# Patient Record
Sex: MALE | Race: WHITE | URBAN - METROPOLITAN AREA
[De-identification: names, ages, dates, MRNs, and addresses within clinical notes are randomized per-mention and may not be internally consistent; named-entity substitution may affect disease eponyms.]

---

## 2017-06-06 ENCOUNTER — OFFICE VISIT (OUTPATIENT)
Dept: ORTHOPEDIC SURGERY | Age: 52
End: 2017-06-06

## 2017-06-06 VITALS — WEIGHT: 175 LBS | BODY MASS INDEX: 26.52 KG/M2 | HEIGHT: 68 IN

## 2017-06-06 DIAGNOSIS — M72.2 PLANTAR FASCIITIS, RIGHT: ICD-10-CM

## 2017-06-06 DIAGNOSIS — M79.671 PAIN OF RIGHT HEEL: Primary | ICD-10-CM

## 2017-06-06 PROCEDURE — 73650 X-RAY EXAM OF HEEL: CPT | Performed by: PODIATRIST

## 2017-06-06 PROCEDURE — 99203 OFFICE O/P NEW LOW 30 MIN: CPT | Performed by: PODIATRIST

## 2017-06-06 RX ORDER — IBUPROFEN 800 MG/1
800 TABLET ORAL EVERY 6 HOURS PRN
COMMUNITY

## 2017-06-06 RX ORDER — PREDNISONE 10 MG/1
TABLET ORAL
Qty: 26 TABLET | Refills: 0 | Status: SHIPPED | OUTPATIENT
Start: 2017-06-06

## 2017-07-05 ENCOUNTER — OFFICE VISIT (OUTPATIENT)
Dept: ORTHOPEDIC SURGERY | Age: 52
End: 2017-07-05

## 2017-07-05 VITALS
SYSTOLIC BLOOD PRESSURE: 117 MMHG | HEART RATE: 74 BPM | BODY MASS INDEX: 26.53 KG/M2 | HEIGHT: 68 IN | WEIGHT: 175.04 LBS | DIASTOLIC BLOOD PRESSURE: 77 MMHG

## 2017-07-05 DIAGNOSIS — M72.2 PLANTAR FASCIITIS, RIGHT: Primary | ICD-10-CM

## 2017-07-05 PROCEDURE — 99213 OFFICE O/P EST LOW 20 MIN: CPT | Performed by: PODIATRIST

## 2017-08-02 ENCOUNTER — TELEPHONE (OUTPATIENT)
Dept: ORTHOPEDIC SURGERY | Age: 52
End: 2017-08-02

## 2017-08-02 ENCOUNTER — OFFICE VISIT (OUTPATIENT)
Dept: ORTHOPEDIC SURGERY | Age: 52
End: 2017-08-02

## 2017-08-02 VITALS
BODY MASS INDEX: 26.53 KG/M2 | HEART RATE: 60 BPM | WEIGHT: 175.04 LBS | HEIGHT: 68 IN | SYSTOLIC BLOOD PRESSURE: 114 MMHG | DIASTOLIC BLOOD PRESSURE: 74 MMHG

## 2017-08-02 DIAGNOSIS — M72.2 PLANTAR FASCIITIS, RIGHT: Primary | ICD-10-CM

## 2017-08-02 PROCEDURE — L4361 PNEUMA/VAC WALK BOOT PRE OTS: HCPCS | Performed by: PODIATRIST

## 2017-08-02 PROCEDURE — 99213 OFFICE O/P EST LOW 20 MIN: CPT | Performed by: PODIATRIST

## 2017-08-30 ENCOUNTER — OFFICE VISIT (OUTPATIENT)
Dept: ORTHOPEDIC SURGERY | Age: 52
End: 2017-08-30

## 2017-08-30 VITALS
HEART RATE: 74 BPM | SYSTOLIC BLOOD PRESSURE: 111 MMHG | HEIGHT: 68 IN | WEIGHT: 175.04 LBS | DIASTOLIC BLOOD PRESSURE: 80 MMHG | BODY MASS INDEX: 26.53 KG/M2

## 2017-08-30 DIAGNOSIS — M72.2 PLANTAR FASCIITIS, RIGHT: Primary | ICD-10-CM

## 2017-08-30 PROCEDURE — 99213 OFFICE O/P EST LOW 20 MIN: CPT | Performed by: PODIATRIST

## 2017-08-30 RX ORDER — DEXAMETHASONE SODIUM PHOSPHATE 4 MG/ML
INJECTION, SOLUTION INTRA-ARTICULAR; INTRALESIONAL; INTRAMUSCULAR; INTRAVENOUS; SOFT TISSUE
Qty: 30 ML | Refills: 0 | Status: SHIPPED | OUTPATIENT
Start: 2017-08-30

## 2017-09-13 ENCOUNTER — HOSPITAL ENCOUNTER (OUTPATIENT)
Dept: PHYSICAL THERAPY | Age: 52
Discharge: HOME | End: 2017-09-30
Admitting: PODIATRIST

## 2017-09-13 NOTE — PLAN OF CARE
plantar fasciitis (chronic), hx R ankle sprains    Falls Risk Assessment (30 days):   [x] Falls Risk assessed and no intervention required. [] Falls Risk assessed and Patient requires intervention due to being higher risk   TUG score (>12s at risk):     [] Falls education provided, including       G-Codes:  PT G-Codes  Functional Assessment Tool Used: LEFS  Score: 43%  Functional Limitation: Mobility: Walking and moving around  Mobility: Walking and Moving Around Current Status (): At least 40 percent but less than 60 percent impaired, limited or restricted  Mobility: Walking and Moving Around Goal Status ():  At least 1 percent but less than 20 percent impaired, limited or restricted    ASSESSMENT:   Functional Impairments:     []Noted lumbar/proximal hip/LE joint hypomobility   [x]Decreased LE functional ROM   [x]Decreased core/proximal hip strength and neuromuscular control   [x]Decreased LE functional strength   [x]Reduced balance/proprioceptive control   []other:      Functional Activity Limitations (from functional questionnaire and intake)   [x]Reduced ability to tolerate prolonged functional positions:standing   []Reduced ability or difficulty with changes of positions or transfers between positions   []Reduced ability to maintain good posture and demonstrate good body mechanics with sitting, bending, and lifting   []Reduced ability to sleep   [] Reduced ability or tolerance with driving and/or computer work   []Reduced ability to perform lifting, carrying tasks   []Reduced ability to squat   []Reduced ability to forward bend   [x]Reduced ability to ambulate prolonged functional periods/distances/surfaces   []Reduced ability to ascend/descend stairs   [x]Reduced ability to run, hop, cut or jump   []other:    Participation Restrictions   []Reduced participation in self care activities   [x]Reduced participation in home management activities   []Reduced participation in work activities   [x]Reduced participation in social activities. [x]Reduced participation in sport/recreation activities. Classification :    []Signs/symptoms consistent with post-surgical status including decreased ROM, strength and function. []Signs/symptoms consistent with joint sprain/strain   []Signs/symptoms consistent with patella-femoral syndrome   []Signs/symptoms consistent with knee OA/hip OA   []Signs/symptoms consistent with internal derangement of knee/Hip   [x]Signs/symptoms consistent with functional hip weakness/NMR control      [x]Signs/symptoms consistent with tendinitis/tendinosis    []signs/symptoms consistent with pathology which may benefit from Dry needling      [x]other:  S/s consistent with midfoot and ankle instability    Prognosis/Rehab Potential:      []Excellent   [x]Good    []Fair   []Poor    Tolerance of evaluation/treatment:    []Excellent   [x]Good    []Fair   []Poor  Physical Therapy Evaluation Complexity Justification  [x] A history of present problem with:  [x] no personal factors and/or comorbidities that impact the plan of care;  []1-2 personal factors and/or comorbidities that impact the plan of care  []3 personal factors and/or comorbidities that impact the plan of care  [x] An examination of body systems using standardized tests and measures addressing any of the following: body structures and functions (impairments), activity limitations, and/or participation restrictions;:  [] a total of 1-2 or more elements   [x] a total of 3 or more elements   [] a total of 4 or more elements   [x] A clinical presentation with:  [x] stable and/or uncomplicated characteristics   [] evolving clinical presentation with changing characteristics  [] unstable and unpredictable characteristics;   [x] Clinical decision making of [x] low, [] moderate, [] high complexity using standardized patient assessment instrument and/or measurable assessment of functional outcome.     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        Electronically signed by:  Carmella Moore PT, DPT

## 2017-09-13 NOTE — FLOWSHEET NOTE
and ambulation/stair navigation      Manual Treatments:  PROM / STM / Oscillations-Mobs:  G-I, II, III, IV (PA's, Inf., Post.)  [x] (52437) Provided manual therapy to mobilize LE, proximal hip and/or LS spine soft tissue/joints for the purpose of modulating pain, promoting relaxation,  increasing ROM, reducing/eliminating soft tissue swelling/inflammation/restriction, improving soft tissue extensibility and allowing for proper ROM for normal function with self care, mobility, lifting and ambulation. Modalities: ionto 40mA to R medial calcaneal tubercle (10 min + set-up)     Charges:  Timed Code Treatment Minutes: 30   Total Treatment Minutes: 65 (eval, ionto)     [x] EVAL (LOW) 16714 (typically 20 minutes face-to-face)  [] EVAL (MOD) 59160 (typically 30 minutes face-to-face)  [] EVAL (HIGH) 71648 (typically 45 minutes face-to-face)  [] RE-EVAL     [x] WM(22307) x  1   [x] IONTO  [] NMR (54410) x      [] VASO  [x] Manual (96387) x  1    [] Other:  [] TA x       [] Mech Traction (90918)  [] ES(attended) (26423)      [] ES (un) (60562):     GOALS: Short Term Goals: To be achieved in: 2 weeks  1. Independent in HEP and progression per patient tolerance, in order to prevent re-injury. 2. Patient will have a decrease in pain to facilitate improvement in movement, function, and ADLs as indicated by Functional Deficits.     Long Term Goals: To be achieved in: 5 weeks  1. Disability index score of 10% or less for the LEFS to assist with reaching prior level of function. 2. Patient will demonstrate increased AROM DF 0-15 deg to allow for proper talocrual joint functioning and improved gastroc/soleus flexibility as indicated by patients Functional Deficits:   3. Patient will demonstrate an increase in Strength to 5/5 R ankle and B LEs (hip abductors) to allow for proper functional mobility as indicated by patients Functional Deficits.    4. Patient will return to ambulating community [facility] functional activities without increased symptoms or restriction. 5. Pt will improve ankle balance/stability for ability to stand on compliant surface x1 min for ability to resume sports activites (golf, karate, softball coaching activities). Progression Towards Functional goals:  [] Patient is progressing as expected towards functional goals listed. [] Progression is slowed due to complexities listed. [] Progression has been slowed due to co-morbidities.   [x] Plan just implemented, too soon to assess goals progression  [] Other:     ASSESSMENT:  See eval    Treatment/Activity Tolerance:  [x] Patient tolerated treatment well [] Patient limited by fatique  [] Patient limited by pain  [] Patient limited by other medical complications  [] Other:     Prognosis: [x] Good [] Fair  [] Poor    Patient Requires Follow-up: [x] Yes  [] No    PLAN: See eval  [] Continue per plan of care [] Alter current plan (see comments)  [x] Plan of care initiated [] Hold pending MD visit [] Discharge    Electronically signed by: Yen Hunt, PT, DPT

## 2017-09-20 ENCOUNTER — HOSPITAL ENCOUNTER (OUTPATIENT)
Dept: PHYSICAL THERAPY | Age: 52
Discharge: HOME | End: 2017-09-20
Admitting: PODIATRIST

## 2017-09-27 ENCOUNTER — HOSPITAL ENCOUNTER (OUTPATIENT)
Dept: PHYSICAL THERAPY | Age: 52
Discharge: HOME | End: 2017-09-27
Admitting: PODIATRIST

## 2017-10-04 ENCOUNTER — HOSPITAL ENCOUNTER (OUTPATIENT)
Dept: PHYSICAL THERAPY | Age: 52
Discharge: HOME | End: 2017-10-04
Admitting: PODIATRIST

## 2017-10-04 NOTE — FLOWSHEET NOTE
[facility], [address] [place]  Phone: [phone]  Fax [phone]    Physical Therapy Daily Treatment Note  Date:  10/4/2017    Patient Name:  King Sifuentes    :  1965  MRN: [id number]  Restrictions/Precautions:    Medical/Treatment Diagnosis Information:  Diagnosis: M72.2 (ICD-10-CM) - Plantar fasciitis, right  Treatment Diagnosis: M72.2 (ICD-10-CM) - Plantar fasciitis, right  Insurance/Certification information:  PT Insurance Information: [address]  Physician Information:  Referring Practitioner: [facility] signed (Y/N):     Date of Patient follow up with Physician:     G-Code (if applicable):   [place]   Date G-Code Applied:  17  PT G-Codes  Functional Assessment Tool Used: LEFS  Score: 43%  Functional Limitation: Mobility: Walking and moving around  Mobility: Walking and Moving Around Current Status (): At least 40 percent but less than 60 percent impaired, limited or restricted  Mobility: Walking and Moving Around Goal Status (): At least 1 percent but less than 20 percent impaired, limited or restricted    Progress Note: [x]  Yes  []  No  Next due by: Visit #10       Latex Allergy:  [x]NO      []YES  Preferred Language for Healthcare:   [x]English       []other:    Visit # Insurance Allowable   4 30     Pain level:  0-1/10     SUBJECTIVE:  Pt continues to make improvement with ability to stand and walk dull day without more than slightly inc'd soreness following activity. He doesn't feel he limps from pain any longer.     OBJECTIVE:   Observation: TTP medial calcaneal tubercle and prox to mid PF, bunion with mild hallux valgus and midfoot instability; symmetrical stance wearing B OTC orthotics entering clinic today  Test measurements:      RESTRICTIONS/PRECAUTIONS:     Exercises/Interventions:     Therapeutic Ex Sets/sec Reps Notes   Pt ed: footwear, consideration for custom molded orthotics if still struggling with pain after PT       gastroc stretch-with mid-foot/arch support on 1/2 foam roll  Soleus stretch \" \" \" 30\"x3 R/L ea  Cueing for knee/foot alignment         Towel crunches  Toe yoga: 1st ray flex with 2nd-4th ext and vice versa  HEP   theratube roll on arch   Contin for HEP         Reformer WIP  Reformer B PF 3x20 B  2x10  3 red springs  2 red springs   SL hip abd-dayana wall slide    + HEP   Side-stepping  \" \" in mini squat  Monster walk  Retro monster walk + HEP   Glider hip abd  Glider hip abd/ext diagonal x20 R/L ea  GVL   ankle inv/ever strength with tband Green TB         Manual Intervention      SASTM R prox to mid plantar fascia 10'     PF stretch with toe ext 30\"x2     Post TC mob gr III-IV  15\"x5                       NMR re-education      SL balance on airex with UE med ball 3 D lift  FHB activation instruction in SLS shoes off 1st 1' R/L  Shoes off   SL balance with contralat LE flex, abd, ext kicks Shoes on   Tandem balance on 1/2 foam roll-roll side down 1' R/L forward     AP \"rock\" balance 1/2 roam roll-roll side down 30\"x2 B     SL balance with rebounder toss 2x30 reps R/L ea  blue med ball             Therapeutic Exercise and NMR EXR  [x] (65680) Provided verbal/tactile cueing for activities related to strengthening, flexibility, endurance, ROM for improvements in LE, proximal hip, and core control with self care, mobility, lifting, ambulation. [x] (68716) Provided verbal/tactile cueing for activities related to improving balance, coordination, kinesthetic sense, posture, motor skill, proprioception  to assist with LE, proximal hip, and core control in self care, mobility, lifting, ambulation and eccentric single leg control.      NMR and Therapeutic Activities:    [x] (44659 or 09612) Provided verbal/tactile cueing for activities related to improving balance, coordination, kinesthetic sense, posture, motor skill, proprioception and motor activation to allow for proper function of core, proximal hip and LE with self care and ADLs  [] (89801) Gait Re-education- Provided training and instruction to the patient for proper LE, core and proximal hip recruitment and positioning and eccentric body weight control with ambulation re-education including up and down stairs     Home Exercise Program:    [x] (59521) Reviewed/Progressed HEP activities related to strengthening, flexibility, endurance, ROM of core, proximal hip and LE for functional self-care, mobility, lifting and ambulation/stair navigation   [] (89327)Reviewed/Progressed HEP activities related to improving balance, coordination, kinesthetic sense, posture, motor skill, proprioception of core, proximal hip and LE for self care, mobility, lifting, and ambulation/stair navigation      Manual Treatments:  PROM / STM / Oscillations-Mobs:  G-I, II, III, IV (PA's, Inf., Post.)  [x] (05711) Provided manual therapy to mobilize LE, proximal hip and/or LS spine soft tissue/joints for the purpose of modulating pain, promoting relaxation,  increasing ROM, reducing/eliminating soft tissue swelling/inflammation/restriction, improving soft tissue extensibility and allowing for proper ROM for normal function with self care, mobility, lifting and ambulation. Modalities: ionto 40mA to R medial calcaneal tubercle (10 min + 2' set-up)     Charges:  Timed Code Treatment Minutes: 42   Total Treatment Minutes: 54 (ionto)     [] EVAL (LOW) 18309 (typically 20 minutes face-to-face)  [] EVAL (MOD) 23461 (typically 30 minutes face-to-face)  [] EVAL (HIGH) 17437 (typically 45 minutes face-to-face)  [] RE-EVAL     [x] NG(17680) x  1   [x] IONTO  [x] NMR (49214) x  1   [] VASO  [x] Manual (22129) x  1    [] Other:  [] TA x       [] Mech Traction (61843)  [] ES(attended) (19210)      [] ES (un) (93087):     GOALS: Short Term Goals: To be achieved in: 2 weeks  1. Independent in HEP and progression per patient tolerance, in order to prevent re-injury.    2. Patient will have a decrease in pain to facilitate improvement in movement, function, and ADLs as indicated by Functional Deficits.     Long Term Goals: To be achieved in: 5 weeks  1. Disability index score of 10% or less for the LEFS to assist with reaching prior level of function. 2. Patient will demonstrate increased AROM DF 0-15 deg to allow for proper talocrual joint functioning and improved gastroc/soleus flexibility as indicated by patients Functional Deficits:   3. Patient will demonstrate an increase in Strength to 5/5 R ankle and B LEs (hip abductors) to allow for proper functional mobility as indicated by patients Functional Deficits. 4. Patient will return to ambulating [facility] functional activities without increased symptoms or restriction. 5. Pt will improve ankle balance/stability for ability to stand on compliant surface x1 min for ability to resume sports activites (golf, karate, softball coaching activities). Progression Towards Functional goals:  [x] Patient is progressing as expected towards functional goals listed. [] Progression is slowed due to complexities listed. [] Progression has been slowed due to co-morbidities. [] Plan just implemented, too soon to assess goals progression  [] Other:     ASSESSMENT:  Pt's pain is improving, and he demonstrates fewer areas of restriction when palpating PF/during SASTM. He's improving his LE control with static balance activities and has improved awareness of hip/knee/foot alignment with dynamic activities this visit. He is making progress towards all goals in preparation for D/C. He will likely not need to pursue custom molded inserts at this time given excellent response to PT/ionto.     Treatment/Activity Tolerance:  [x] Patient tolerated treatment well [] Patient limited by fatique  [] Patient limited by pain  [] Patient limited by other medical complications  [] Other:     Prognosis: [x] Good [] Fair  []

## 2017-10-11 ENCOUNTER — HOSPITAL ENCOUNTER (OUTPATIENT)
Dept: PHYSICAL THERAPY | Age: 52
Discharge: HOME | End: 2017-10-11
Admitting: PODIATRIST

## 2017-10-11 NOTE — FLOWSHEET NOTE
[facility],  [address] [place]  Phone: [phone]  Fax [phone]      ATHLETIC TRAINING [address]  Date:  10/11/2017    Patient Name:  Alan Bowser    :  1965  MRN: [id number]  Restrictions/Precautions:    Medical/Treatment Diagnosis Information:  ·  R plantar fasciitis  ·  R plantar fasciitis  Physician Information:   Dr. Willa Valdes Post-op  8 wks  12 wks 16 wks 20 wks   24 wks                            Activity Log                                                  DOS/DOI:                                                    Date: 10/11/17    ATC communication Hx of L plantar fasciitis also, doing all exercises bilat   Bike    Elliptical    Treadmill    Airdyne        Gastroc stretch    Soleus stretch    Hamstring stretch    ITB stretch    Hip Flexor stretch    Quad stretch    Adductor stretch        Weight Shifting sp                              fp                              tp    Lateral walking (with/w/o TB)        Balance: PEP/Lorraine board                   SLS BOSU R/L 5x10\"         Star excursion load/explode          Extremity reach UE/LE R/L SLS w/opp foot reach to toe touch A/P, R/L lat 10x ea       Leg Press Mohan. Ecc.                      Inv. Calf Press Mohan. Ecc.                        Inv.        CLAUDETTE   Flex               ABd               ADd              TKE               Ext        Steps Up               Up and Over               Down               Lateral               Rotation        Squats  mini                  wall                 BOSU         Lunges:  Lunge to Balance                   Balance to Lunge                   Walking        Knee Extension Bilat. Ecc.                               Inv. Hamstring Curls Bilat.                                Ecc. Inv.        Soleus Press Bilat. Ecc.                           Inv.                             Ladders                Square               Jump/Hop  Low                      Med.                      High                              Reformer FW Walking 2R1B 2x10   Reformer FW Heel Dips ball sqz 2R1B 2x10   Reformer FW Parallel Toes 2R1B 2x10   Reformer FW Parallel Heels ball sqz 2R1B 2x10                                 Modality Ionto in house   Initials                             HERMILO

## 2017-10-11 NOTE — FLOWSHEET NOTE
Great toe ext still limited but good toe-off with gait    RESTRICTIONS/PRECAUTIONS:     Exercises/Interventions:     Therapeutic Ex Sets/sec Reps Notes   Pt ed: footwear, consideration for custom molded orthotics if still struggling with pain after PT       gastroc stretch-with mid-foot/arch support on 1/2 foam roll  Soleus stretch \" \" \" 30\"x3 R/L ea  Cueing for knee/foot alignment         Towel crunches  Toe yoga: 1st ray flex with 2nd-4th ext and vice versa  HEP   theratube roll on arch x3' R/L  Contin for HEP         Reformer WIP  Reformer B PF 3x20 B  2x10  With AT 3 red springs  2 red springs   SL hip abd-dayana wall slide    + HEP   Side-stepping  \" \" in mini squat  Monster walk  Retro monster walk + HEP   Glider hip abd  Glider hip abd/ext diagonal x20 R/L ea  GVL   ankle inv/ever strength with tband Green TB   Step-up 8\" to stork on airex  R SLS on 6\" with LLE reach behind x20  x20     Standing foot \"dome\" focus on 1st ray  activation x20     Manual Intervention      SASTM R prox to mid plantar fascia 10'     PF stretch with toe ext 30\"x2     Post TC mob gr III-IV  15\"x5                       NMR re-education      SL balance on airex with UE med ball 3 D lift  FHB activation instruction in SLS shoes off 1st   Shoes off   SL balance with contralat LE flex, abd, ext kicks    Tandem balance on 1/2 foam roll-roll side down      AP \"rock\" balance 1/2 roam roll-roll side down 30\"x2 B     SL balance with rebounder toss 2x30 reps R/L ea    blue med ball   BAPS cw, ccw  x20 ea L2 standing  L/R       Therapeutic Exercise and NMR EXR  [x] (18801) Provided verbal/tactile cueing for activities related to strengthening, flexibility, endurance, ROM for improvements in LE, proximal hip, and core control with self care, mobility, lifting, ambulation.   [x] (41505) Provided verbal/tactile cueing for activities related to improving balance, coordination, kinesthetic sense, posture, motor skill, proprioception  to assist with LE, proximal hip, and core control in self care, mobility, lifting, ambulation and eccentric single leg control. NMR and Therapeutic Activities:    [x] (45915 or 86809) Provided verbal/tactile cueing for activities related to improving balance, coordination, kinesthetic sense, posture, motor skill, proprioception and motor activation to allow for proper function of core, proximal hip and LE with self care and ADLs  [] (99725) Gait Re-education- Provided training and instruction to the patient for proper LE, core and proximal hip recruitment and positioning and eccentric body weight control with ambulation re-education including up and down stairs     Home Exercise Program:    [x] (61085) Reviewed/Progressed HEP activities related to strengthening, flexibility, endurance, ROM of core, proximal hip and LE for functional self-care, mobility, lifting and ambulation/stair navigation   [] (22938)Reviewed/Progressed HEP activities related to improving balance, coordination, kinesthetic sense, posture, motor skill, proprioception of core, proximal hip and LE for self care, mobility, lifting, and ambulation/stair navigation      Manual Treatments:  PROM / STM / Oscillations-Mobs:  G-I, II, III, IV (PA's, Inf., Post.)  [x] (45086) Provided manual therapy to mobilize LE, proximal hip and/or LS spine soft tissue/joints for the purpose of modulating pain, promoting relaxation,  increasing ROM, reducing/eliminating soft tissue swelling/inflammation/restriction, improving soft tissue extensibility and allowing for proper ROM for normal function with self care, mobility, lifting and ambulation.      Modalities: ionto 40mA to R medial calcaneal tubercle 10' (2' set-up)     Charges:  Timed Code Treatment Minutes: 38   Total Treatment Minutes: 50 (ionto)     [] EVAL (LOW) 65876 (typically 20 minutes face-to-face)  [] EVAL (MOD) 83634 (typically 30 minutes face-to-face)  [] EVAL (HIGH) 15915 (typically 45 minutes face-to-face)  []

## 2017-10-18 ENCOUNTER — HOSPITAL ENCOUNTER (OUTPATIENT)
Dept: PHYSICAL THERAPY | Age: 52
Discharge: HOME | End: 2017-10-18
Admitting: PODIATRIST

## 2017-10-18 NOTE — FLOWSHEET NOTE
[facility], [address] [place]  Phone: [phone]  Fax [phone]    Physical Therapy Daily Treatment Note  Date:  10/18/2017    Patient Name:  Masha Ferrell    :  1965  MRN: [id number]  Restrictions/Precautions:    Medical/Treatment Diagnosis Information:  Diagnosis: M72.2 (ICD-10-CM) - Plantar fasciitis, right  Treatment Diagnosis: M72.2 (ICD-10-CM) - Plantar fasciitis, right  Insurance/Certification information:  PT Insurance Information: [address]  Physician Information:  Referring Practitioner: [facility] signed (Y/N):     Date of Patient follow up with Physician:     G-Code (if applicable):   [place]   Date G-Code Applied:  17  PT G-Codes  Functional Assessment Tool Used: LEFS  Score: 43%  Functional Limitation: Mobility: Walking and moving around  Mobility: Walking and Moving Around Current Status (): At least 40 percent but less than 60 percent impaired, limited or restricted  Mobility: Walking and Moving Around Goal Status (): At least 1 percent but less than 20 percent impaired, limited or restricted    Progress Note: [x]  Yes  []  No  Next due by: Visit #10       Latex Allergy:  [x]NO      []YES  Preferred Language for Healthcare:   [x]English       []other:    Visit # Insurance Allowable   6 30     Pain level:  0 in morning/10     SUBJECTIVE: Pt states he's felt no pain, just inc'd soreness/tightness in 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 As long as he's wearing his orthotics he feels ok.      OBJECTIVE:   Observation: min TTP medial calcaneal tubercle and prox to mid PF, bunion with mild hallux valgus and midfoot instability; symmetrical stance wearing B OTC orthotics entering clinic today; gait= WNL  Test measurements:  Great toe ext still limited but good toe-off with gait    RESTRICTIONS/PRECAUTIONS:     Exercises/Interventions:     Therapeutic Therapeutic Activities:    [x] (13688 or 77433) Provided verbal/tactile cueing for activities related to improving balance, coordination, kinesthetic sense, posture, motor skill, proprioception and motor activation to allow for proper function of core, proximal hip and LE with self care and ADLs  [] (43587) Gait Re-education- Provided training and instruction to the patient for proper LE, core and proximal hip recruitment and positioning and eccentric body weight control with ambulation re-education including up and down stairs     Home Exercise Program:    [x] (47684) Reviewed/Progressed HEP activities related to strengthening, flexibility, endurance, ROM of core, proximal hip and LE for functional self-care, mobility, lifting and ambulation/stair navigation   [] (68214)Reviewed/Progressed HEP activities related to improving balance, coordination, kinesthetic sense, posture, motor skill, proprioception of core, proximal hip and LE for self care, mobility, lifting, and ambulation/stair navigation      Manual Treatments:  PROM / STM / Oscillations-Mobs:  G-I, II, III, IV (PA's, Inf., Post.)  [x] (34248) Provided manual therapy to mobilize LE, proximal hip and/or LS spine soft tissue/joints for the purpose of modulating pain, promoting relaxation,  increasing ROM, reducing/eliminating soft tissue swelling/inflammation/restriction, improving soft tissue extensibility and allowing for proper ROM for normal function with self care, mobility, lifting and ambulation.      Modalities: ionto 80mA to go patch o R medial calcaneal tubercle (3' set-up)     Charges:  Timed Code Treatment Minutes: 30   Total Treatment Minutes: 33 (ionto)     [] EVAL (LOW) 24986 (typically 20 minutes face-to-face)  [] EVAL (MOD) 53939 (typically 30 minutes face-to-face)  [] EVAL (HIGH) 86038 (typically 45 minutes face-to-face)  [] RE-EVAL     [x] MC(08868) x  1   [x] IONTO  [] NMR (58647) x      [] VASO  [x] Manual (30728) x  1    [] Other:  [] TA